# Patient Record
(demographics unavailable — no encounter records)

---

## 2017-05-19 NOTE — ER DOCUMENT REPORT
Addendum entered and electronically signed by DESI CAMPBELL NP  05/25/ 17 21:49: 








Procedures





- Immobilization


  ** Right Wrist


Immobilizer type: Ulnar


Performed by: PCT


Post-Proc Neuro Vasc Exam: Normal


Alignment checked and good: Yes





Original Note:








ED Hand/Wrist Injury





- General


Chief Complaint: Hand Injury


Stated Complaint: RIGHT HAND PAIN


Time Seen by Provider: 05/19/17 10:04


Mode of Arrival: Ambulatory


Information source: Patient


Notes: 


2-year-old man presents to ED for right hand pain.  He states he got angry and 

hit a wall yesterday.  He has used ice packs but the hand is not feeling 

better.  He has a previous fracture to the same hand.


TRAVEL OUTSIDE OF THE [place] IN LAST 30 DAYS: No





- HPI


Injury to: Hand


Onset: Yesterday


Where: Public place


Timing: Still present


Quality of pain: Sharp, Throbbing


Severity: Severe


Pain Level: 5


Context: Other - punched a wall





- Related Data


Allergies/Adverse Reactions: 


 





No Known Allergies Allergy (Verified 05/19/17 08:33)


 











Past Medical History





- General


Information source: Patient





- Social History


Smoking Status: Former Smoker


Cigarette use (# per day): No


Chew tobacco use (# tins/day): No


Smoking Education Provided: No


Frequency of alcohol use: Occasional


Drug Abuse: None


Occupation: mess marshall


Lives with: Spouse/Significant other


Family History: CVA, DM, Hypertension, Thyroid Disfunction


Patient has suicidal ideation: No


Patient has homicidal ideation: No





- Past Medical History


Cardiac Medical History: Reports: None


Pulmonary Medical History: Reports: None


EENT Medical History: Reports: None


Neurological Medical History: Reports: None


Endocrine Medical History: Reports: None


Renal/ Medical History: Reports: None


Malignancy Medical History: Reports None


GI Medical History: Reports: None


Musculoskeltal Medical History: Reports None


Skin Medical History: Reports None


Psychiatric Medical History: Reports: None


Traumatic Medical History: Reports: Hx Fractures - Right hand


Infectious Medical History: Reports: None


Surgical Hx: Negative





- Immunizations


Hx Diphtheria, Pertussis, Tetanus Vaccination: Yes





Review of Systems





- Review of Systems


Constitutional: No symptoms reported


EENT: No symptoms reported


Cardiovascular: No symptoms reported


Respiratory: No symptoms reported


Gastrointestinal: No symptoms reported


Genitourinary: No symptoms reported


Male Genitourinary: No symptoms reported


Musculoskeletal: Other - Right ulnar side of hand painful swelling bruised.


Skin: No symptoms reported


Hematologic/Lymphatic: No symptoms reported


Neurological/Psychological: No symptoms reported


-: Yes All other systems reviewed and negative





Physical Exam





- Vital signs


Vitals: 


 











Temp Pulse Resp BP Pulse Ox


 


 97.7 F   103 H  14   115/75   100 


 


 05/19/17 08:33  05/19/17 08:33  05/19/17 08:33  05/19/17 08:33  05/19/17 08:33











Interpretation: Normal





- General


General appearance: Appears well, Alert





- HEENT


Head: Normocephalic, Atraumatic


Eyes: Normal


Pupils: PERRL





- Respiratory


Respiratory status: No respiratory distress


Chest status: Nontender


Breath sounds: Normal


Chest palpation: Normal





- Cardiovascular


Rhythm: Regular


Heart sounds: Normal auscultation


Murmur: No





- Abdominal


Inspection: Normal


Distension: No distension


Bowel sounds: Normal


Tenderness: Nontender


Organomegaly: No organomegaly





- Back


Back: Normal, Nontender





- Extremities


General upper extremity: Normal temperature


General lower extremity: Normal inspection, Nontender, Normal color, Normal ROM

, Normal temperature, Normal weight bearing.  No: Nolberto's sign


Wrist: No: Limited ROM


Hand: Tender - 5th Metacarpal area, Ecchymosis - 5th Metacarpal area, No 

evidence of human bite, No evidence of FB, Swelling - Fifth metacarpal area, 

Other - Range of motion of fingers some pain with movement of hands.  No: 

Abrasion, Deformity, Dislocation, Tendon deficit





- Neurological


Neuro grossly intact: Yes


Cognition: Normal


Orientation: AAOx4


Llewellyn Coma Scale Eye Opening: Spontaneous


Llewellyn Coma Scale Verbal: Oriented


Huy Coma Scale Motor: Obeys Commands


Huy Coma Scale Total: 15


Speech: Normal


Motor strength normal: LUE, RUE, LLE, RLE


Sensory: Normal





- Psychological


Associated symptoms: Normal affect, Normal mood





- Skin


Skin Temperature: Warm


Skin Moisture: Dry


Skin Color: Normal





Course





- Re-evaluation


Re-evalutation: 


05/19/17 14:42


Discussed x-rays with patient patient treated with ibuprofen in the emergency 

room.  Home with a prescription for Norco, instructions on elevation and ice, 

and instructions to follow-up with orthopedic.














- Vital Signs


Vital signs: 


 











Temp Pulse Resp BP Pulse Ox


 


 97.9 F   78   14   110/61   97 


 


 05/19/17 11:11  05/19/17 11:11  05/19/17 11:11  05/19/17 11:11  05/19/17 11:11














- Diagnostic Test


Radiology reviewed: Image reviewed, Reports reviewed





Discharge





- Discharge


Clinical Impression: 


 Boxer Fracture right





Condition: Stable


Disposition: HOME, SELF-CARE


Additional Instructions: 


Fractured Fifth Metacarpal (Boxer's)





     You have a fracture of the fifth metacarpal bone in the hand, often called 

a Boxer's Fracture.  The fracture is usually caused by striking the knuckle 

against a hard surface -- such as hitting a wall with the fist.


     This fracture heals well.  Some degree of angle in the fracture is 

perfectly acceptable, resulting in only a slightly rounder knuckle. Your 

physician has determined whether your fracture could benefit from "setting", 

and has outlined a treatment plan for you.


     The usual treatment is splinting for four to six weeks -- a cast is not 

usually necessary.  At first, the injury should be elevated and ice packed.


     Contact the doctor at once if swelling or pain becomes severe, or if 

numbness develops.





SPLINT PRECAUTIONS:


     A splint has been placed.  This will protect the area while healing 

begins.  Your problem does NOT normally require a cast.  It MUST, however, be 

held still!  Keep the splint on ALL THE TIME until instructed to remove it by 

the doctor.


     As you begin to use the area, be careful.  You shouldn't do anything which 

causes discomfort -- you may disturb the injury even with the splint in place.


     After the initial period of rest and elevation, if splint does not prevent 

pain when you move, come back.  You may require placement of a different splint

, or a cast.


     If there is unexpected severe pain, or numbness, discoloration, or 

swelling beyond the splint, you should return at once.  If you feel that the 

splint has broken or become loose, come back.





Sling to be Used





     You are to use a sling.  This is to rest the area, and to prevent it from 

hanging downward.  Use this sling for at least 48 hours (or longer if so 

instructed by the doctor).  Some types of splints will break if not supported 

by the sling, so the sling must be used as long as the splint.


     Ice can be placed inside the sling over the injured area.


     Once you remove the sling, you should not encounter pain when you use the 

arm and hand.  If you do feel pain beneath the cast or splint, you must 

continue use of the sling.





ICE & ELEVATION:


     Apply ice packs frequently against the painful area.  Many different 

schedules are recommended, such as "20 minutes on, 20 minutes off" or "one hour 

ice, two hours rest."  If you need to work, you may need to go longer between 

ice treatments.  You should plan to have the area ice packed AT LEAST one-

fourth of the time.


     The ice should be applied over the wrap, tape, or splint, or over a layer 

of cloth -- not directly against the skin.  Some ice bags have a built-in cloth 

and can be put directly on the skin.


     Your injured part should be elevated as much as possible over the next 48 

hours.  Try to keep the injury above the level of the heart. Avoid use of the 

injured area.  Elevation and rest will decrease the swelling.








USE OF OVER-THE-COUNTER IBUPROFEN:


     Ibuprofen (Advil, Nuprin, Medipren, Motrin IB) is a medication for fever 

and pain control.  In addition, it has anti- inflammatory effects which may be 

beneficial, especially in the treatment of injuries.


     It's best to take ibuprofen with food.  Persons with ulcer disease or 

allergy to aspirin should notify their physician of this before taking 

ibuprofen.


     Ibuprofen can be given every four to six hours, for a total of four doses 

daily.


     Age              Pain or fever dose          Antiinflammatory dose


     6-8 yr              200 mg (1 tab)                200 mg (1 tab)


     9-11 yr             200 mg (1 tab)                200-400 mg (1-2 tab)


     11-14 yr            200-400 mg (1-2 tab)         400 mg (2 tab)


     15-adult            400 mg (2 tab)                600 mg (3 tab)








ORAL NARCOTIC MEDICATION:


     You have been given a prescription for pain control.  This medication is a 

narcotic.  It's best taken with food, as nausea can result if taken on an empty 

stomach.


     Don't operate machinery or drive within six hours of taking this 

medication.  Do not combine this medicine with alcohol, or with any medication 

which can cause sedation (such as cold tablets or sleeping pills) unless you 

get permission from the physician.


     Narcotics tend to cause constipation.  If possible, drink plenty of fluids 

and eat a diet high in fiber and fruits.





     Please be aware that prescription narcotics also have the potential for 

abuse.  People become addicted to these medications because of the general 

sense of wellbeing that they induce.  This feeling along with a significant 

reduction in tension, anxiety, and aggression provides a stimulating seductive 

quality to these drugs.  Once your pain is under control, we encourage you to 

discard your unused narcotics.








FOLLOW-UP CARE:


If you have been referred to a physician for follow-up care, call the physician

s office for an appointment as you were instructed or within the next two days.

  If you experience worsening or a significant change in your symptoms, notify 

the physician immediately or return to the Emergency Department at any time for 

re-evaluation.


Prescriptions: 


Hydrocodone/Acetaminophen [Norco 5-325 mg Tablet] 1 tab PO Q6HP PRN #10 tablet


 PRN Reason: 


Forms:  Return to Work


Referrals: 


MADINA MAHER MD [ASSOCIATE] - Follow up as needed

## 2017-05-27 NOTE — ER DOCUMENT REPORT
ED General





- General


Chief Complaint: Congestion


Stated Complaint: CONGESTION


Time Seen by Provider: 05/27/17 05:06


Notes: 


Patient is a 23-year-old male who presents with complaint of nasal congestion 

and cough.  Symptoms ongoing for 24 hours.  Is a smoker.  He is not taking any 

over-the-counter medications.  He is coughing up phlegm.  No abdominal pain.  

No fevers.  No vomiting.  No diarrhea.  Patient second complaint is of pain in 

his right hand.  He was seen here a few days ago diagnosed with a boxer's 

fracture in place in a splint.  He says that the Ace wrap is coming off the 

splint.  He has not yet followed up with orthopedic doctor.  He has not yet 

made an appointment.


TRAVEL OUTSIDE OF THE [place] IN LAST 30 DAYS: No





- Related Data


Allergies/Adverse Reactions: 


 





No Known Allergies Allergy (Verified 05/19/17 08:33)


 











Past Medical History





- Social History


Smoking Status: Current Every Day Smoker


Frequency of alcohol use: None


Drug Abuse: Marijuana


Family History: CVA, DM, Hypertension, Thyroid Disfunction


Patient has suicidal ideation: No


Patient has homicidal ideation: No


Renal/ Medical History: Denies: Hx Peritoneal Dialysis


Traumatic Medical History: Reports: Hx Fractures - Right hand





- Immunizations


Hx Diphtheria, Pertussis, Tetanus Vaccination: Yes





Review of Systems





- Review of Systems


Notes: 


My Normal Review Basic





REVIEW OF SYSTEMS:


CONSTITUTIONAL :  Denies fever,  chills, or sweats.  Denies recent illness.


EENT: Nasal congestion


CARDIOVASCULAR:  Denies chest pain.


RESPIRATORY: Cough


GASTROINTESTINAL:  Denies abdominal pain.  Denies nausea, vomiting, or 

diarrhea.  Denies constipation.  Last BM: 


MUSCULOSKELETAL: Right hand pain.


SKIN:   Denies rash or skin lesions.


NEUROLOGICAL:  Denies altered mental status or loss of consciousness.  Denies 

headache.  Denies weakness or paralysis or loss of use of either side.  Denies 

problems with gait or speech.  Denies sensory or motor loss.


ALL OTHER SYSTEMS REVIEWED AND NEGATIVE.





Physical Exam





- Vital signs


Vitals: 





 











Temp Pulse Resp BP Pulse Ox


 


 97.8 F   76   16   128/74 H  100 


 


 05/27/17 04:37  05/27/17 04:37  05/27/17 04:37  05/27/17 04:37  05/27/17 04:37














- Notes


Notes: 


General Appearance: Well nourished, alert, cooperative, no acute distress, no 

obvious discomfort.  Audible nasal congestion during exam.


Vitals: reviewed, See vital signs table.


Head: no swelling or tenderness to the head


Eyes: PERRL, EOMI, Conjuctiva clear


Mouth: No decreasd moisture


Throat: No tonsillar inflammation, No airway obstruction,  No lymphadenopathy


Ears: Normal-appearing tympanic membranes.


Neck: Supple, no neck tenderness, No thyromegaly


Lungs: No wheezing, No rales, No rhonci, No accessory muscle use, good air 

exchange bilaterally.


Heart: Normal rate, Regular rythm, No murmur, no rub


Abdomen: Normal BS, soft, No rigidity, No abdominal tenderness, No guarding, no 

rebound, no abdominal masses, no organomegaly


Extremities: strength 5/5 in all extremities, good pulses in all extremities, 

patient has a ulnar gutter splint in place on the right hand.  His splint has 

shifted some.  The 2 Ace wraps appear to have been reapplied but in a quick 

manner.  He has good distal sensation.  Good capillary refill., no edema.


Skin: warm, dry, appropriate color, no rash


Neuro: speech clear, oriented x 3, normal affect, responds appropriately to 

questions.








Course





- Vital Signs


Vital signs: 





 











Temp Pulse Resp BP Pulse Ox


 


 97.8 F   76   16   128/74 H  100 


 


 05/27/17 04:37  05/27/17 04:37  05/27/17 04:37  05/27/17 04:37  05/27/17 04:37














- Transfer of Care


Notes: 





05/27/17 05:25


I did loosen the Ace wrap and the patient splints.  I did adjust the splint and 

reapplied apply the Ace wrap.  He says his hand feels much better now.  I 

strongly encouraged him to call the orthopedist to make a follow-up 

appointment.  Patient does have a lot of audible nasal congestion and is very 

congestion exam.  His lung fields are clear.  I talked about quitting smoking.  

I will give him a dose Decadron.  I will place him on Sudafed.  Encouraged to 

return to ER if has fevers or worsening of symptoms.  Patient agrees with plan 

will be discharged home.





Dictation of this chart was performed using voice recognition software; 

therefore, there may be some unintended grammatical errors.





Discharge





- Discharge


Clinical Impression: 


URI (upper respiratory infection)


Qualifiers:


 URI type: unspecified URI Qualified Code(s): J06.9 - Acute upper respiratory 

infection, unspecified





Condition: Good


Disposition: HOME, SELF-CARE


Additional Instructions: 


UPPER RESPIRATORY ILLNESS:





     You have a viral infection of the respiratory passages -- a "cold."  This 

common infection causes nasal congestion, drainage, and often sore throat and 

cough.  It is highly contagious.  The disease usually lasts about 10 to 14 days.


     There is no "cure" for the viral infection -- it must run its course.  If 

there is a complication, such as bacterial infection in the nose, sinuses, 

middle ear, or bronchial tubes, antibiotics may be required.  The antibiotics 

won't affect the virus.


     Drink plenty of fluids.  A humidifier may help.  An expectorant medication 

or decongestant may make you more comfortable.  Use acetaminophen or ibuprofen 

for fever or aches.


     See the doctor if fever persists over two days, if there is any 

significant worsening of your symptoms, or if you simply fail to improve as 

expected.











DECONGESTANT MEDICATION:


     A decongestant medicine has been prescribed.  Often this medicine is 

combined in the same tablet with an antihistamine or expectorant. This type of 

medicine is helpful in treating a bad cold or sinus condition, as well as in 

treatment of the nasal congestion of hay fever.  It is not of much benefit for 

lung infections.


     Decongestant medicines are related to stimulants.  They can cause an 

increase in blood pressure and heart rate.  Persons with heart disease and high 

blood pressure should not take decongestants without discussing this with the 

physician.


     If you develop palpitations, chest pain, headache, or tremors, stop the 

medicine and consult your physician.











STEROID MEDICATION:


     You have been given an injection of or oral medicine of the cortisone/

steroid class.  This medication is used to control inflammation or allergy.  

Chad t is usually only given for a short period of time, until the acute process 

subsides.


     There are usually no side effects from short-term use of cortisone-like 

medications.  Some persons feel an increased sense of well-being and are not 

sleepy at bedtime.  Long-term use of cortisone medications is best avoided, 

unless required for a severe condition.  If your condition does not remit, or 

relapses after the course of corticosteroid medication, you should consult your 

physician.








SMOKING:


     If you smoke, you should stop smoking.  The tar and chemicals in cigarette 

smoke are harmful.  Smoking has been shown to cause:


          emphysema


          chronic bronchitis


          lung cancer


          mouth and throat cancer


          stomach and pancreas cancer


          premature aging


          birth defects


     In addition, smoking increases ear and lung infections in children of 

smokers.








FOLLOW-UP CARE:


If you have been referred to a physician for follow-up care, call the physician

s office for an appointment as you were instructed or within the next two days.

  If you experience worsening or a significant change in your symptoms, notify 

the physician immediately or return to the Emergency Department at any time for 

re-evaluation.








Please return to the ER immediately if you have worsening difficulty breathing, 

fevers, or feel that you are worsening. Please follow up closely with the 

orthopedic doctor in regards to your hand fracture.


Prescriptions: 


Pseudoephedrine HCl 30 mg PO BID #14 tablet


Referrals: 


CARY MARTINES MD [ACTIVE STAFF] - 05/30/17

## 2017-06-20 NOTE — ER DOCUMENT REPORT
ED Medical Screen (RME)





- General


Chief Complaint: Lower Abdominal Pain


Stated Complaint: ABDOMINAL PAIN


Time Seen by Provider: 06/20/17 12:28


Notes: 


23-year-old male patient reports when he got up about 6 AM this morning he had 

an upset stomach.  He had a little bit of vomiting, the nausea is better now.  

He has some loose stools.  He states at this time he has mild discomfort and 

thinks that he really just needs to have a bowel movement.





I have greeted and performed a rapid initial assessment of this patient.  A 

comprehensive ED assessment and evaluation of the patient, analysis of test 

results and completion of the medical decision making process will be conducted 

by additional ED providers.


TRAVEL OUTSIDE OF THE [place] IN LAST 30 DAYS: No





- Related Data


Allergies/Adverse Reactions: 


 





No Known Allergies Allergy (Verified 06/20/17 12:02)


 











Past Medical History


Renal/ Medical History: Denies: Hx Peritoneal Dialysis


Traumatic Medical History: Reports: Hx Fractures - Right hand





- Immunizations


Hx Diphtheria, Pertussis, Tetanus Vaccination: Yes





Physical Exam





- Vital signs


Vitals: 


 











Temp Pulse Resp BP Pulse Ox


 


 98.2 F   97   18   105/70   99 


 


 06/20/17 12:04  06/20/17 12:04  06/20/17 12:04  06/20/17 12:04  06/20/17 12:04














Course





- Vital Signs


Vital signs: 


 











Temp Pulse Resp BP Pulse Ox


 


 98.2 F   97   18   105/70   99 


 


 06/20/17 12:04  06/20/17 12:04  06/20/17 12:04  06/20/17 12:04  06/20/17 12:04

## 2017-06-20 NOTE — ER DOCUMENT REPORT
HPI





- HPI


Patient complains to provider of: vomiting and diarrhea


Onset: This morning


Onset/Duration: Sudden


Quality of pain: Cramping


Severity: Moderate


Pain Level: 3


Context: 


Patient states he woke up this morning and vomited 1, and has had several 

loose stools.  Denies fever or known sick contacts.


Associated Symptoms: Diarrhea, Nausea, Vomiting


Exacerbated by: Food


Relieved by: Denies


Similar symptoms previously: Yes


Recently seen / treated by doctor: No





- ROS


ROS below otherwise negative: Yes


Systems Reviewed and Negative: Yes All other systems reviewed and negative





- CONSTITUTIONAL


Constitutional: DENIES: Fever





- EENT


EENT: DENIES: Congestion





- NEURO


Neurology: DENIES: Headache





- CARDIOVASCULAR


Cardiovascular: DENIES: Chest pain





- RESPIRATORY


Respiratory: DENIES: Trouble Breathing





- GASTROINTESTINAL


Gastrointestinal: REPORTS: Abdominal Pain - Cramping, Nausea, Patient vomiting, 

Diarrhea





- URINARY


Urinary: DENIES: Dysuria





- REPRODUCTIVE


Reproductive: DENIES: Pregnant:





- MUSCULOSKELETAL


Musculoskeletal: DENIES: Extremity pain





- DERM


Skin Color: Normal


Skin Problems: None





Past Medical History





- General


Information source: Patient





- Social History


Smoking Status: Current Every Day Smoker


Cigarette use (# per day): Yes


Frequency of alcohol use: None


Drug Abuse: None


Lives with: Family


Family History: CVA, DM, Hypertension, Thyroid Disfunction


Patient has suicidal ideation: No


Patient has homicidal ideation: No


Traumatic Medical History: Reports: Hx Fractures - Right hand


Surgical Hx: Negative





- Immunizations


Hx Diphtheria, Pertussis, Tetanus Vaccination: Yes





Vertical Provider Document





- CONSTITUTIONAL


Agree With Documented VS: Yes


Exam Limitations: No Limitations


General Appearance: WD/WN, No Apparent Distress





- INFECTION CONTROL


TRAVEL OUTSIDE OF THE [place] IN LAST 30 DAYS: No





- HEENT


HEENT: Atraumatic, Normal ENT Exam, Normocephalic





- RESPIRATORY


Respiratory: Breath Sounds Normal, No Respiratory Distress


O2 Sat by Pulse Oximetry: 99





- CARDIOVASCULAR


Cardiovascular: Regular Rate, Regular Rhythm





- GI/ABDOMEN


Gastrointestinal: Abdomen Soft, Abdomen Non-Tender, Abnormal Bowel Sounds - 

Hyperactive





- MUSCULOSKELETAL/EXTREMETIES


Musculoskeletal/Extremeties: MAEW, FROM





- NEURO


Level of Consciousness: Awake, Alert, Appropriate





- DERM


Integumentary: Warm, Dry





Course





- Vital Signs


Vital signs: 


 











Temp Pulse Resp BP Pulse Ox


 


 98.2 F   97   18   105/70   99 


 


 06/20/17 12:04  06/20/17 12:04  06/20/17 12:04  06/20/17 12:04  06/20/17 12:04














Discharge





- Discharge


Clinical Impression: 


 Vomiting and diarrhea





Condition: Good


Disposition: HOME, SELF-CARE


Additional Instructions: 


Plan as needed for nausea or vomiting


Push fluids for 24 hours, advance diet as tolerated


OTC Imodium as needed for diarrhea


follow Up with your doctor for recheck if not better in 48 hours


Return as needed


Prescriptions: 


Ondansetron [Zofran Odt 4 mg Tablet] 1 - 2 tab PO Q4HP PRN #10 tab.rapdis


 PRN Reason: 


Forms:  Return to Work

## 2018-01-10 NOTE — ER DOCUMENT REPORT
HPI





- HPI


Patient complains to provider of: cough and sore throat


Onset: Other - 3 days


Pain Level: 5


Context: 





23 yo male with sore throat and cough for 3 days. No chest pain or SOB. No abd. 

pain. NO fever.


Associated Symptoms: None


Exacerbated by: Denies


Relieved by: Denies


Similar symptoms previously: Yes


Recently seen / treated by doctor: No





- ROS


ROS below otherwise negative: Yes


Systems Reviewed and Negative: Yes All other systems reviewed and negative





- CONSTITUTIONAL


Constitutional: DENIES: Fever, Chills





- EENT


EENT: REPORTS: Sore Throat.  DENIES: Ear Pain, Eye problems





- RESPIRATORY


Respiratory: REPORTS: Coughing





- REPRODUCTIVE


Reproductive: DENIES: Pregnant:





- DERM


Skin Color: Normal, Pink





Past Medical History





- General


Information source: Patient





- Social History


Smoking Status: Current Every Day Smoker


Chew tobacco use (# tins/day): No


Frequency of alcohol use: Social


Drug Abuse: None


Lives with: Family


Family History: CVA, DM, Hypertension, Thyroid Disfunction


Patient has suicidal ideation: No


Patient has homicidal ideation: No


Renal/ Medical History: Denies: Hx Peritoneal Dialysis


Traumatic Medical History: Reports: Hx Fractures - Right hand





- Immunizations


Hx Diphtheria, Pertussis, Tetanus Vaccination: Yes





Vertical Provider Document





- CONSTITUTIONAL


Agree With Documented VS: Yes


Exam Limitations: No Limitations





- INFECTION CONTROL


TRAVEL OUTSIDE OF THE [place] IN LAST 30 DAYS: No





- HEENT


HEENT: Normocephalic, Pharyngeal Erythema





- NECK


Neck: Supple, Lymphadenopathy-Left - anterior, Lymphadenopathy-Right - anterior





- RESPIRATORY


Respiratory: Breath Sounds Normal, No Respiratory Distress


O2 Sat by Pulse Oximetry: 100





- CARDIOVASCULAR


Cardiovascular: Regular Rate, Regular Rhythm





- GI/ABDOMEN


Gastrointestinal: Abdomen Soft, Abdomen Non-Tender, No Organomegaly, Normal 

Bowel Sounds





- MUSCULOSKELETAL/EXTREMETIES


Musculoskeletal/Extremeties: MARYANA, FROM





- NEURO


Level of Consciousness: Awake, Alert, Appropriate





- DERM


Integumentary: Warm, Dry, No Rash





Course





- Vital Signs


Vital signs: 


 











Temp Pulse Resp BP Pulse Ox


 


 98.3 F   77   16   122/73   100 


 


 01/09/18 23:33  01/09/18 23:33  01/09/18 23:33  01/09/18 23:33  01/09/18 23:33














Discharge





- Discharge


Clinical Impression: 


 Sore throat





Condition: Good


Disposition: HOME, SELF-CARE


Instructions:  Acetaminophen, Penicillin V K ([facility]), Sore Throat ([facility]), Stop 

Smoking ([facility])


Additional Instructions: 


plenty of water


take the penicillin even if you feel better, until it is gone


to er any concerns


stop smoking


Prescriptions: 


Penicillin V Potassium [Penicillin Vk 500 mg Tablet] 500 mg PO QID #40 tablet


Forms:  Return to Work

## 2018-02-20 NOTE — ER DOCUMENT REPORT
ED ENT





- General


Chief Complaint: Headache >24 hrs old


Stated Complaint: SORE THROAT


Time Seen by Provider: 02/20/18 05:25


Mode of Arrival: Ambulatory


Information source: Patient


Notes: 





24-year-old male presents to ED for sore throat and headache since yesterday.  

States he was supposed to work at 11:00 last night and he had it was too bad 

and he was having a sore throat.  He states he also has a runny nose and 

congestion.  Patient denies any chest pain shortness of breath does have a 

cough no fever.


TRAVEL OUTSIDE OF THE [place] IN LAST 30 DAYS: No





- HPI


Patient complains to provider of: Throat problem, Other - Headache


Onset: Yesterday


Onset/Duration: Gradual


Quality of pain: Sharp


Severity: Moderate


Pain Level: 4


Context: Recent Illness


Location of pain: Nose, Throat


Associated symptoms: Congestion, Cough, Runny nose, Sinus pain, Sinus drainage, 

Sore throat.  denies: Chills, Ear pain, Fever


Similar symptoms previously: Yes


Recently seen / treated by doctor: No





- Related Data


Allergies/Adverse Reactions: 


 





No Known Allergies Allergy (Verified 06/20/17 12:02)


 











Past Medical History





- General


Information source: Patient





- Social History


Smoking Status: Current Some Day Smoker


Cigarette use (# per day): Yes - Some days but not every day


Chew tobacco use (# tins/day): No


Smoking Education Provided: Yes - 4 minutes


Frequency of alcohol use: Social


Drug Abuse: None


Occupation: [employer]


Lives with: Family


Family History: CVA, DM, Hypertension, Thyroid Disfunction.  denies: Arthritis, 

CAD, COPD, Hyperlipidemia, Malignancy


Patient has suicidal ideation: No


Patient has homicidal ideation: No





- Past Medical History


Cardiac Medical History: Reports: None


Pulmonary Medical History: Reports: None


EENT Medical History: Reports: None


Neurological Medical History: Reports: None


Endocrine Medical History: Reports: None


Renal/ Medical History: Reports: None


Malignancy Medical History: Reports None


GI Medical History: Reports: None


Musculoskeltal Medical History: Reports Hx Musculoskeletal Trauma


Skin Medical History: Reports None


Psychiatric Medical History: Reports: None


Traumatic Medical History: Reports: Hx Fractures - Right hand


Infectious Medical History: Reports: None


Surgical Hx: Negative


Past Surgical History: Reports: None





- Immunizations


Hx Diphtheria, Pertussis, Tetanus Vaccination: Yes





Review of Systems





- Review of Systems


Notes: 





Constitutional: [PRESENT: as per HPI.  ABSENT: chills, fever(s),  weight gain, 

weight loss]


Eyes: [ABSENT: visual disturbances]


Ears: [ABSENT: hearing changes]


Nasopharyngeal: This patient has runny nose sore throat and headache


Cardiovascular: [ABSENT: chest pain, dyspnea on exertion, edema, orthropnea, 

palpitations]


Respiratory: Patient complains nonproductive cough


Gastrointestinal: [ABSENT: abdominal pain, constipation, diarrhea, hematemesis, 

hematochezia, nausea, vomiting]


Genitourinary: [ABSENT: dysuria, hematuria]


Musculoskeletal: [ABSENT: joint swelling]


Integumentary: [ABSENT: rash, wounds]


Neurological: [ABSENT: abnormal gait, abnormal speech, confusion, dizziness, 

focal weakness, syncope]


Psychiatric: [ABSENT: anxiety, depression, homicidal ideation, suicidal ideation

]


Endocrine: [ABSENT: cold intolerance, heat intolerance, menstrual abnormalities

, polydipsia, polyuria]


Hematologic/Lymphatic: [ABSENT: easy bleeding, easy bruising, lymphadenopathy]





Physical Exam





- Vital signs


Vitals: 


 











Temp Pulse Resp BP Pulse Ox


 


 97.8 F   65   18   103/68   99 


 


 02/20/18 04:57  02/20/18 04:57  02/20/18 04:57  02/20/18 04:57  02/20/18 04:57














- Notes


Notes: 





PHYSICAL EXAMINATION:





GENERAL: 24-year-old male well-appearing, well-nourished and in no acute 

distress.





HEAD: Atraumatic, normocephalic.





EYES: Pupils equal round and reactive to light, extraocular movements intact, 

sclera anicteric, conjunctiva are normal.





ENT: Nasal turbinates red swollen with purulent drainage.  Throat minimally red 

with postnasal drip no exudate noted





NECK: Normal range of motion, supple without lymphadenopathy





LUNGS: Breath sounds clear to auscultation bilaterally and equal.  No wheezes 

rales or rhonchi.





HEART: Regular rate and rhythm without murmurs





ABDOMEN: Soft, nontender, nondistended abdomen.  No guarding, no rebound.  No 

masses appreciated.





Musculoskeletal: Normal range of motion, no pitting or edema.  No cyanosis.





NEUROLOGICAL: Cranial nerves grossly intact.  Normal speech, normal gait.  

Normal sensory, motor exams 





PSYCH: Normal mood, normal affect.





SKIN: Warm, Dry, normal turgor, no rashes or lesions noted.





Course





- Re-evaluation


Re-evalutation: 





02/20/18 06:23


Assessment consistent with upper respiratory infection with a viral sore 

throat.  Patient was treated with ibuprofen for his headache, and Sudafed 

Mucinex and Claritin for his cough and cold symptoms.  Patient was instructed 

on use of Claritin and Sudafed Mucinex and Claritin for his cough and cold 

symptoms and to follow-up with his primary doctor.  Patient verbalized 

understanding of instructions.





- Vital Signs


Vital signs: 


 











Temp Pulse Resp BP Pulse Ox


 


 97.8 F   65   18   103/68   99 


 


 02/20/18 04:57  02/20/18 04:57  02/20/18 04:57  02/20/18 04:57  02/20/18 04:57














Discharge





- Discharge


Clinical Impression: 


URI (upper respiratory infection)


Qualifiers:


 URI type: unspecified URI Qualified Code(s): J06.9 - Acute upper respiratory 

infection, unspecified





Condition: Stable


Disposition: HOME, SELF-CARE


Instructions:  Family Physicians / Practices


Additional Instructions: 


UPPER RESPIRATORY ILLNESS:





     You have a viral infection of the respiratory passages -- a "cold."  This 

common infection causes nasal congestion, drainage, and often sore throat and 

cough.  It is highly contagious.  The disease usually lasts about 10 to 14 days.


     There is no "cure" for the viral infection -- it must run its course.  If 

there is a complication, such as bacterial infection in the nose, sinuses, 

middle ear, or bronchial tubes, antibiotics may be required.  The antibiotics 

won't affect the virus.


     Drink plenty of fluids.  A humidifier may help.  An expectorant medication 

or decongestant may make you more comfortable.  Use acetaminophen or ibuprofen 

for fever or aches.


     See the doctor if fever persists over two days, if there is any 

significant worsening of your symptoms, or if you simply fail to improve as 

expected.





You will treated with Claritin 10 mg, Sudafed 30 mg, and Mucinex 600 mg, in the 

emergency room for your cough and cold symptoms.  He was treated with 800 mg of 

ibuprofen for your headache.  Flonase nasal spray 2 sprays each nose twice a 

day and salt and soda solution gargles will also help your cough and cold 

symptoms





Salt and soda solution


1 quart of water


1 tablespoon of salt


1 teaspoon of baking soda


Mixed 3 ingredients together and boil for 1 minute


Placed in a covered quart jar


Use 1/2 ounce of cold solution to gargle 3 times a day





DECONGESTANT MEDICATION:


     A decongestant medicine has been suggested.  Often this medicine is 

combined in the same tablet with an antihistamine or expectorant. This type of 

medicine is helpful in treating a bad cold or sinus condition, as well as in 

treatment of the nasal congestion of hay fever.  It is not of much benefit for 

lung infections.


     Decongestant medicines are related to stimulants.  They can cause an 

increase in blood pressure and heart rate.  Persons with heart disease and high 

blood pressure should not take decongestants without discussing this with the 

physician.


     If you develop palpitations, chest pain, headache, or tremors, stop the 

medicine and consult your physician.








COUGH-SUPPRESSANT & EXPECTORANT MEDICATION:


     You are to use a cough medication as needed for relief of symptoms.  This 

medicine is a combination of an expectorant (to make the mucous thinner and 

more easily "coughed up") and a cough suppressant (to reduce the frequency of 

coughing).


     The cough-suppressant medicine is related to narcotics.  You may 

experience mild nausea and sleepiness.  Some patients who are very sensitive to 

narcotics may have stomach pain from this medicine. Taking the medicine with 

food reduces these side effects.  Do not drive or work with machinery until you 

know how this medicine affects you.


     The expectorant should have no side effects.  Iodine-containing 

expectorants (such as organidin) should not be taken by persons with active 

thyroid disease unless approved by your doctor.


     Call the doctor if you develop shortness of breath, hives, rash, itching, 

lightheadedness, or severe nausea and vomiting.











USE OF ACETAMINOPHEN (Tylenol):


     Acetaminophen may be taken for pain relief or fever control. It's much 

safer than aspirin, offering a wider range of "safe" dosages.  It is safe 

during pregnancy.  Some brand names are Tylenol, Panadol, Datril, Anacin 3, 

Tempra, and Liquiprin. Acetaminophen can be repeated every four hours.  The 

following are maximum recommended dosages:


>89 pounds or adults          650 mg to 900 mg


Acetaminophen can be repeated every four hours.  Maximum dose not to exceed 

4000 mg a day.








SMOKING:


     If you smoke, you should stop smoking.  The tar and chemicals in cigarette 

smoke are harmful.  Smoking has been shown to cause:


          emphysema


          chronic bronchitis


          lung cancer


          mouth and throat cancer


          stomach and pancreas cancer


          premature aging


          birth defects


     In addition, smoking increases ear and lung infections in children of 

smokers.








FOLLOW-UP CARE:


If you have been referred to a physician for follow-up care, call the physician

s office for an appointment as you were instructed or within the next two days.

  If you experience worsening or a significant change in your symptoms, notify 

the physician immediately or return to the Emergency Department at any time for 

re-evaluation.





Forms:  Smoking Cessation Education, Return to Work